# Patient Record
Sex: MALE | Race: WHITE | Employment: FULL TIME | URBAN - METROPOLITAN AREA
[De-identification: names, ages, dates, MRNs, and addresses within clinical notes are randomized per-mention and may not be internally consistent; named-entity substitution may affect disease eponyms.]

---

## 2017-02-17 ENCOUNTER — HOSPITAL ENCOUNTER (OUTPATIENT)
Dept: LAB | Age: 37
Discharge: HOME | End: 2017-02-17

## 2017-02-17 PROCEDURE — 88305 TISSUE EXAM BY PATHOLOGIST: CPT | Performed by: INTERNAL MEDICINE

## 2018-03-15 ENCOUNTER — HOSPITAL ENCOUNTER (OUTPATIENT)
Dept: LAB | Age: 38
Discharge: HOME | End: 2018-03-15

## 2018-03-15 PROCEDURE — 88305 TISSUE EXAM BY PATHOLOGIST: CPT | Performed by: INTERNAL MEDICINE

## 2018-03-27 PROBLEM — C73 PAPILLARY THYROID CARCINOMA (HCC): Status: ACTIVE | Noted: 2018-03-27

## 2018-03-29 ENCOUNTER — HOSPITAL ENCOUNTER (OUTPATIENT)
Dept: ULTRASOUND IMAGING | Age: 38
Discharge: HOME | End: 2018-03-29
Attending: INTERNAL MEDICINE
Payer: COMMERCIAL

## 2018-03-29 DIAGNOSIS — C73 PAPILLARY THYROID CARCINOMA (HCC): ICD-10-CM

## 2018-03-29 PROCEDURE — 76536 US EXAM OF HEAD AND NECK: CPT

## 2018-03-29 NOTE — PROGRESS NOTES
Please let . Constantine Casas know that I just looked at his lymph node ultrasound, and he has no obviously abnormal appearing lymph nodes. I will be sure to pass that on to the surgeon to whom we have referred him. If he has questions, let me know.

## 2018-10-01 ENCOUNTER — HOSPITAL ENCOUNTER (OUTPATIENT)
Dept: INFUSION THERAPY | Age: 38
Discharge: HOME | End: 2018-10-01
Payer: COMMERCIAL

## 2018-10-01 VITALS
SYSTOLIC BLOOD PRESSURE: 147 MMHG | HEART RATE: 74 BPM | TEMPERATURE: 98.2 F | OXYGEN SATURATION: 98 % | DIASTOLIC BLOOD PRESSURE: 85 MMHG | RESPIRATION RATE: 18 BRPM

## 2018-10-01 PROCEDURE — 74011000250 HC RX REV CODE- 250: Performed by: INTERNAL MEDICINE

## 2018-10-01 PROCEDURE — 96372 THER/PROPH/DIAG INJ SC/IM: CPT

## 2018-10-01 PROCEDURE — 74011250636 HC RX REV CODE- 250/636: Performed by: INTERNAL MEDICINE

## 2018-10-01 RX ADMIN — WATER 0.9 MG: 1 INJECTION INTRAMUSCULAR; INTRAVENOUS; SUBCUTANEOUS at 07:55

## 2018-10-01 NOTE — PROGRESS NOTES
Arrived to the [facility]. Thyrogen injection completed. Patient tolerated well. Any issues or concerns during appointment: none. Patient aware of next infusion appointment on 10/2/18 at 0800. Discharged ambulatory.  
 
Salo Dockery RN

## 2018-10-02 ENCOUNTER — HOSPITAL ENCOUNTER (OUTPATIENT)
Dept: INFUSION THERAPY | Age: 38
Discharge: HOME | End: 2018-10-02
Payer: COMMERCIAL

## 2018-10-02 VITALS
HEART RATE: 82 BPM | WEIGHT: 304.8 LBS | SYSTOLIC BLOOD PRESSURE: 141 MMHG | TEMPERATURE: 97.6 F | DIASTOLIC BLOOD PRESSURE: 87 MMHG | OXYGEN SATURATION: 100 % | BODY MASS INDEX: 40.21 KG/M2 | RESPIRATION RATE: 18 BRPM

## 2018-10-02 PROCEDURE — 96372 THER/PROPH/DIAG INJ SC/IM: CPT

## 2018-10-02 PROCEDURE — 74011000250 HC RX REV CODE- 250: Performed by: INTERNAL MEDICINE

## 2018-10-02 PROCEDURE — 74011250636 HC RX REV CODE- 250/636: Performed by: INTERNAL MEDICINE

## 2018-10-02 RX ADMIN — WATER 0.9 MG: 1 INJECTION INTRAMUSCULAR; INTRAVENOUS; SUBCUTANEOUS at 08:15

## 2018-10-02 NOTE — PROGRESS NOTES
Arrived to the [facility]. Thyrogen injection completed. Patient tolerated well Any issues or concerns during appointment: No. 
No further appointments in [facility]PI @ this time Discharged ambulatory

## 2018-10-03 ENCOUNTER — HOSPITAL ENCOUNTER (OUTPATIENT)
Dept: NUCLEAR MEDICINE | Age: 38
Discharge: HOME | End: 2018-10-03
Attending: INTERNAL MEDICINE

## 2018-10-03 ENCOUNTER — HOSPITAL ENCOUNTER (OUTPATIENT)
Dept: LAB | Age: 38
Discharge: HOME | End: 2018-10-03
Attending: INTERNAL MEDICINE
Payer: COMMERCIAL

## 2018-10-03 DIAGNOSIS — C73 PAPILLARY THYROID CARCINOMA (HCC): ICD-10-CM

## 2018-10-03 LAB
T4 FREE SERPL-MCNC: 1.1 NG/DL (ref 0.78–1.46)
TSH SERPL DL<=0.005 MIU/L-ACNC: 53 UIU/ML

## 2018-10-03 PROCEDURE — 36415 COLL VENOUS BLD VENIPUNCTURE: CPT

## 2018-10-03 PROCEDURE — 86800 THYROGLOBULIN ANTIBODY: CPT

## 2018-10-03 PROCEDURE — 84443 ASSAY THYROID STIM HORMONE: CPT

## 2018-10-03 PROCEDURE — 84439 ASSAY OF FREE THYROXINE: CPT

## 2018-10-03 PROCEDURE — 84432 ASSAY OF THYROGLOBULIN: CPT

## 2018-10-04 LAB
THYROGLOB AB SERPL-ACNC: <1 IU/ML (ref 0–0.9)
THYROGLOBULIN, SERUM, 006694: 0.4 NG/ML (ref 1.4–29.2)

## 2018-10-10 ENCOUNTER — HOSPITAL ENCOUNTER (OUTPATIENT)
Dept: NUCLEAR MEDICINE | Age: 38
Discharge: HOME | End: 2018-10-10
Attending: INTERNAL MEDICINE
Payer: COMMERCIAL

## 2018-10-10 PROCEDURE — 79005 NUCLEAR RX ORAL ADMIN: CPT

## 2019-04-26 ENCOUNTER — HOSPITAL ENCOUNTER (OUTPATIENT)
Dept: LAB | Age: 39
Discharge: HOME | End: 2019-04-26

## 2019-04-26 PROCEDURE — 88305 TISSUE EXAM BY PATHOLOGIST: CPT

## 2021-12-17 ENCOUNTER — HOSPITAL ENCOUNTER (OUTPATIENT)
Dept: LAB | Age: 41
Discharge: HOME | End: 2021-12-17

## 2021-12-17 PROCEDURE — 88305 TISSUE EXAM BY PATHOLOGIST: CPT

## 2022-03-18 PROBLEM — C73 PAPILLARY THYROID CARCINOMA (HCC): Status: ACTIVE | Noted: 2018-03-27

## 2022-06-02 DIAGNOSIS — C73 PAPILLARY THYROID CARCINOMA (HCC): Primary | ICD-10-CM

## 2022-06-02 DIAGNOSIS — E03.9 PRIMARY HYPOTHYROIDISM: ICD-10-CM

## 2022-09-21 DIAGNOSIS — E03.9 PRIMARY HYPOTHYROIDISM: ICD-10-CM

## 2022-09-21 DIAGNOSIS — C73 PAPILLARY THYROID CARCINOMA (HCC): ICD-10-CM

## 2022-09-22 LAB
T4 FREE SERPL-MCNC: 1.1 NG/DL (ref 0.78–1.46)
TSH, 3RD GENERATION: 0.1 UIU/ML (ref 0.36–3.74)

## 2022-09-23 ENCOUNTER — OFFICE VISIT (OUTPATIENT)
Dept: ENDOCRINOLOGY | Age: 42
End: 2022-09-23
Payer: COMMERCIAL

## 2022-09-23 VITALS
HEART RATE: 87 BPM | BODY MASS INDEX: 43.4 KG/M2 | OXYGEN SATURATION: 98 % | DIASTOLIC BLOOD PRESSURE: 88 MMHG | SYSTOLIC BLOOD PRESSURE: 124 MMHG | WEIGHT: 315 LBS

## 2022-09-23 DIAGNOSIS — C73 PAPILLARY THYROID CARCINOMA (HCC): Primary | ICD-10-CM

## 2022-09-23 DIAGNOSIS — E03.9 PRIMARY HYPOTHYROIDISM: ICD-10-CM

## 2022-09-23 LAB
THYROGLOB AB SERPL-ACNC: <1 IU/ML (ref 0–0.9)
THYROGLOBULIN: <0.1 NG/ML (ref 1.4–29.2)

## 2022-09-23 PROCEDURE — 99214 OFFICE O/P EST MOD 30 MIN: CPT | Performed by: INTERNAL MEDICINE

## 2022-09-23 RX ORDER — LEVOTHYROXINE SODIUM 137 UG/1
274 TABLET ORAL
Qty: 180 TABLET | Refills: 3
Start: 2022-09-23

## 2022-09-23 NOTE — PROGRESS NOTES
Emy Jaramillo MD, [address] [facility]            Reason for visit: Follow-up of thyroid cancer      ASSESSMENT AND PLAN:    1. Papillary thyroid carcinoma [facility])  He is now status post thyroidectomy 6/28/2018 for a [id number] papillary thyroid carcinoma. He received 102.1 mCi iodine 131 10/3/2018 (I recommended 50 mCi). Both suppressed and stimulated pretreatment thyroglobulin was very low. Posttreatment whole-body scan demonstrated no evidence of local or metastatic disease. Ultrasound done in January 2020 was unremarkable. His prognosis going forward is excellent, and risk of recurrence is low. - Thyroglobulin Panel; Future    2. Primary hypothyroidism  She has hypothyroidism diagnosed prior to thyroidectomy. TSH target is 0.1-1.5. Continue levothyroxine as currently prescribed. Return in 6 months with labs. - levothyroxine (SYNTHROID) 137 MCG tablet; Take 2 tablets by mouth every morning (before breakfast)  Dispense: 180 tablet; Refill: 3  - TSH; Future  - T4, Free; Future        Follow-up and Dispositions    Return in about 6 months (around 3/23/2023). History of Present Illness:    THYROID CANCER  Anthony Talbot is seen in the [place] [id number] for follow-up of thyroid cancer. He is now status post thyroidectomy in June 2018 and radioactive iodine in October 2018.        Type of thyroid cancer: papillary thyroid cancer     Date thyroid cancer diagnosed: 3/16/2018 on fine-needle biopsy of a sonographically suspicious left thyroid lobe nodule     Date(s) of surgery: 6/28/2018 (total thyroidectomy/left central lymphadenectomy, Dr. Kvng Benitez)     Surgical pathology: 1.1 cm classic papillary carcinoma in the left lobe, 4/11 level VI lymph nodes positive     AJCC stage: T1 - Tumor 2 cm or less in greatest dimension limited to the thyroid, N1a - Metastasis to Level VI (pretracheal, paratracheal, and prelaryngeal/Delphian lymph nodes), M0 - Distant metastasis not present (stage I)     Radioactive iodine treatment: 10/3/2018- 102.1 mCi iodine-131 following Thyrogen stimulation (I recommended 50 mCi)     Treatment of hypothyroidism: He was diagnosed with hypothyroidism in his late teens (prior to thyroidectomy). He has been on levothyroxine since diagnosis. The most recent dose adjustments have been as follows:  -levothyroxine 125 mcg daily (dose since 2016 or 2017) to Synthroid 225 mcg daily following thyroidectomy  -levothyroxine 224 mcg daily in January 2019  -250 mcg daily 12/30/2021  -274 mcg 4/14/2022     Labs:  8/17/2018: TSH 0.100, free T4 1.69, thyroglobulin 0.2, thyroglobulin antibodies less than 1.0, calcium 10.1.  10/3/2018: Thyrogen-stimulated thyroglobulin 0.4, thyroglobulin antibodies less than 1.0 (TSH 53.000). 1/8/2019: TSH 0.117, free T4 1.46, thyroglobulin less than 0.1, thyroglobulin antibodies less than 1.0.  7/19/2019: TSH 0.528, free T4 1.36, thyroglobulin less than 0.1, thyroglobulin antibodies less than 1.0.  1/31/2020: TSH 1.410, free T4 0.95, thyroglobulin less than 0.1, thyroglobulin antibodies less than 1.0.  7/28/2020: TSH 0.782, thyroglobulin less than 0.1, thyroglobulin antibodies less than 1.0.  12/28/2021: TSH 7.910, free T4 0.92, thyroglobulin less than 0.1, thyroglobulin antibodies less than 1.0.  4/5/2022: TSH 4.850, free T4 1.08.  9/21/2022: TSH 0.095, free T4 1.1, thyroglobulin pending, thyroglobulin antibodies pending. Imaging:   Before surgery:  2/28/2018: Ultrasound [facility])- Right lobe 8.0 x 4.1 x 4.5 cm, isthmus 0.5 cm, left lobe 6.1 x 1.5 x 1.9 cm.  6.3 x 3.7 x 4.5 cm predominantly solid nodule replacing most of the right lobe. 1.1 x 0.8 x 0.9 cm hypoechoic solid nodule without microcalcifications in the lateral midportion of the left lobe. 1.1 x 0.8 x 0.9 cm hypoechoic solid nodule without microcalcifications in the medial midportion of the left lobe.   1.5 x 0.9 x 1.0 cm hypoechoic solid nodule without microcalcifications at the left lower pole. 3/29/2018: [facility] no obvious lympadenopathy     Since surgery:  10/10/2018: Thyroid-stimulating posttreatment whole-body scan ([facility])- Uptake in the thyroid bed. No local or distant metastatic disease. 1/31/2020: Ultrasound ([facility])- no evidence for local recurrence. Current symptoms: See review of systems below     Family history of thyroid cancer:  No    Review of Systems   Constitutional:  Negative for fatigue and unexpected weight change. Cardiovascular:  Negative for palpitations. /88 (Site: Left Upper Arm, Position: Sitting)   Pulse 87   Wt (!) 320 lb (145.2 kg)   SpO2 98%   BMI 43.40 kg/m²   Wt Readings from Last 3 Encounters:   09/23/22 (!) 320 lb (145.2 kg)   04/14/22 (!) 325 lb (147.4 kg)   12/30/21 (!) 328 lb 9.6 oz (149.1 kg)       Physical Exam  Constitutional:       Appearance: Normal appearance. HENT:      Head: Normocephalic. Neck:      Thyroid: No thyroid mass or thyromegaly. Comments: Healed thyroidectomy scar. No palpable neck masses. Cardiovascular:      Rate and Rhythm: Normal rate and regular rhythm. Pulmonary:      Effort: Pulmonary effort is normal.      Breath sounds: Normal breath sounds. Neurological:      Mental Status: He is alert.    Psychiatric:         Mood and Affect: Mood normal.         Behavior: Behavior normal.       Orders Placed This Encounter   Procedures    TSH     Standing Status:   Future     Standing Expiration Date:   9/23/2023    T4, Free     Standing Status:   Future     Standing Expiration Date:   9/23/2023    Thyroglobulin Panel     Standing Status:   Future     Standing Expiration Date:   9/23/2023       Current Outpatient Medications   Medication Sig Dispense Refill    levothyroxine (SYNTHROID) 137 MCG tablet Take 2 tablets by mouth every morning (before breakfast) 180 tablet 3    Multiple Vitamin (MULTIVITAMIN PO) Take by mouth      loratadine

## 2023-03-28 DIAGNOSIS — E03.9 PRIMARY HYPOTHYROIDISM: ICD-10-CM

## 2023-03-28 DIAGNOSIS — C73 PAPILLARY THYROID CARCINOMA (HCC): ICD-10-CM

## 2023-03-29 LAB
THYROGLOB AB SERPL-ACNC: <1 IU/ML (ref 0–0.9)
THYROGLOBULIN: <0.1 NG/ML (ref 1.4–29.2)

## 2023-03-30 LAB
T4 FREE SERPL-MCNC: 0.8 NG/DL (ref 0.78–1.46)
TSH, 3RD GENERATION: 2.31 UIU/ML (ref 0.36–3.74)

## 2023-03-31 ENCOUNTER — OFFICE VISIT (OUTPATIENT)
Dept: ENDOCRINOLOGY | Age: 43
End: 2023-03-31
Payer: COMMERCIAL

## 2023-03-31 VITALS
DIASTOLIC BLOOD PRESSURE: 75 MMHG | OXYGEN SATURATION: 97 % | SYSTOLIC BLOOD PRESSURE: 120 MMHG | WEIGHT: 311 LBS | HEART RATE: 87 BPM | BODY MASS INDEX: 42.18 KG/M2

## 2023-03-31 DIAGNOSIS — C73 PAPILLARY THYROID CARCINOMA (HCC): Primary | ICD-10-CM

## 2023-03-31 DIAGNOSIS — E03.9 PRIMARY HYPOTHYROIDISM: ICD-10-CM

## 2023-03-31 PROCEDURE — 99214 OFFICE O/P EST MOD 30 MIN: CPT | Performed by: INTERNAL MEDICINE

## 2023-03-31 RX ORDER — ALLOPURINOL 100 MG/1
100 TABLET ORAL DAILY
COMMUNITY
Start: 2023-02-16

## 2023-03-31 RX ORDER — LISINOPRIL 20 MG/1
20 TABLET ORAL EVERY MORNING
COMMUNITY
Start: 2023-02-07

## 2023-03-31 RX ORDER — AMLODIPINE BESYLATE 10 MG/1
TABLET ORAL
COMMUNITY
Start: 2023-02-07

## 2023-03-31 RX ORDER — LEVOTHYROXINE SODIUM 137 UG/1
274 TABLET ORAL
Qty: 180 TABLET | Refills: 3 | Status: SHIPPED | OUTPATIENT
Start: 2023-03-31

## 2023-03-31 NOTE — PROGRESS NOTES
Mendel Daily, MD, [address] [facility]            Reason for visit: Follow-up of thyroid cancer      ASSESSMENT AND PLAN:    1. Papillary thyroid carcinoma [facility])  He is now status post thyroidectomy 6/28/2018 for a [id number] papillary thyroid carcinoma. He received 102.1 mCi iodine 131 10/3/2018 (I recommended 50 mCi). Both suppressed and stimulated pretreatment thyroglobulin was very low. Posttreatment whole-body scan demonstrated no evidence of local or metastatic disease. Ultrasound done in January 2020 was unremarkable. His prognosis going forward is excellent, and risk of recurrence is low. - Thyroglobulin Panel; Future    2. Primary hypothyroidism  She has hypothyroidism diagnosed prior to thyroidectomy. TSH target is 0.1-1.5. His TSH is currently slightly above that but acceptable. I will have him continue levothyroxine as currently prescribed. Return in 6 months with labs. - levothyroxine (SYNTHROID) 137 MCG tablet; Take 2 tablets by mouth every morning (before breakfast)  Dispense: 180 tablet; Refill: 3  - TSH; Future  - T4, Free; Future        Follow-up and Dispositions    Return in about 6 months (around 9/30/2023). History of Present Illness:    THYROID CANCER  Patrick Villa is seen in the [place] [id number] for follow-up of thyroid cancer. He is now status post thyroidectomy in June 2018 and radioactive iodine in October 2018.        Type of thyroid cancer: papillary thyroid cancer     Date thyroid cancer diagnosed: 3/16/2018 on fine-needle biopsy of a sonographically suspicious left thyroid lobe nodule     Date(s) of surgery: 6/28/2018 (total thyroidectomy/left central lymphadenectomy, Dr. Whit Colon)     Surgical pathology: 1.1 cm classic papillary carcinoma in the left lobe, 4/11 level VI lymph nodes positive     AJCC stage: T1 - Tumor 2 cm or less in greatest dimension limited to the thyroid, N1a - Metastasis to Level VI

## 2023-10-04 DIAGNOSIS — E03.9 PRIMARY HYPOTHYROIDISM: ICD-10-CM

## 2023-10-04 DIAGNOSIS — C73 PAPILLARY THYROID CARCINOMA (HCC): ICD-10-CM

## 2023-10-04 LAB
T4 FREE SERPL-MCNC: 1 NG/DL (ref 0.78–1.46)
TSH, 3RD GENERATION: 0.26 UIU/ML (ref 0.36–3.74)

## 2023-10-05 LAB
THYROGLOB AB SERPL-ACNC: <1 IU/ML (ref 0–0.9)
THYROGLOBULIN: <0.1 NG/ML (ref 1.4–29.2)

## 2023-10-06 ENCOUNTER — OFFICE VISIT (OUTPATIENT)
Dept: ENDOCRINOLOGY | Age: 43
End: 2023-10-06
Payer: COMMERCIAL

## 2023-10-06 VITALS
DIASTOLIC BLOOD PRESSURE: 74 MMHG | BODY MASS INDEX: 44.76 KG/M2 | HEART RATE: 105 BPM | WEIGHT: 315 LBS | OXYGEN SATURATION: 98 % | SYSTOLIC BLOOD PRESSURE: 120 MMHG

## 2023-10-06 DIAGNOSIS — C73 PAPILLARY THYROID CARCINOMA (HCC): Primary | ICD-10-CM

## 2023-10-06 DIAGNOSIS — E03.9 PRIMARY HYPOTHYROIDISM: ICD-10-CM

## 2023-10-06 PROCEDURE — 99214 OFFICE O/P EST MOD 30 MIN: CPT | Performed by: INTERNAL MEDICINE

## 2023-10-06 RX ORDER — LEVOTHYROXINE SODIUM 137 UG/1
274 TABLET ORAL
Qty: 180 TABLET | Refills: 3 | Status: SHIPPED | OUTPATIENT
Start: 2023-10-06

## 2023-10-06 NOTE — PROGRESS NOTES
Jean Garcia MD, [facility]            Reason for visit: Follow-up of thyroid cancer      ASSESSMENT AND PLAN:    1. Papillary thyroid carcinoma [facility])  He is now status post thyroidectomy 6/28/2018 for a [id number] papillary thyroid carcinoma. He received 102.1 mCi iodine 131 10/3/2018 (I recommended 50 mCi). Both suppressed and stimulated pretreatment thyroglobulin was very low. Posttreatment whole-body scan demonstrated no evidence of local or metastatic disease. Ultrasound done in January 2020 was unremarkable. His prognosis going forward is excellent, and risk of recurrence is low. - Thyroglobulin Panel; Future    2. Primary hypothyroidism  She has hypothyroidism diagnosed prior to thyroidectomy. TSH target is 0.1-1.5. His TSH is currently at target. I will have him continue levothyroxine as currently prescribed. Return in 6 months with labs. - levothyroxine (SYNTHROID) 137 MCG tablet; Take 2 tablets by mouth every morning (before breakfast)  Dispense: 180 tablet; Refill: 3  - TSH; Future  - T4, Free; Future        Follow-up and Dispositions    Return in about 6 months (around 4/6/2024). History of Present Illness:    THYROID CANCER  Ana Javed is seen in the [facility] for follow-up of thyroid cancer. He is now status post thyroidectomy in June 2018 and radioactive iodine in October 2018.        Type of thyroid cancer: papillary thyroid cancer     Date thyroid cancer diagnosed: 3/16/2018 on fine-needle biopsy of a sonographically suspicious left thyroid lobe nodule     Date(s) of surgery: 6/28/2018 (total thyroidectomy/left central lymphadenectomy, Dr. Destiny Qureshi)     Surgical pathology: 1.1 cm classic papillary carcinoma in the left lobe, 4/11 level VI lymph nodes positive     AJCC stage: T1 - Tumor 2 cm or less in greatest dimension limited to the thyroid, N1a - Metastasis to Level VI (pretracheal, paratracheal, and

## 2024-04-11 DIAGNOSIS — C73 PAPILLARY THYROID CARCINOMA (HCC): ICD-10-CM

## 2024-04-11 DIAGNOSIS — E03.9 PRIMARY HYPOTHYROIDISM: ICD-10-CM

## 2024-04-11 LAB
T4 FREE SERPL-MCNC: 1 NG/DL (ref 0.78–1.46)
TSH, 3RD GENERATION: 2.95 UIU/ML (ref 0.36–3.74)

## 2024-04-12 ENCOUNTER — OFFICE VISIT (OUTPATIENT)
Dept: ENDOCRINOLOGY | Age: 44
End: 2024-04-12
Payer: COMMERCIAL

## 2024-04-12 VITALS — DIASTOLIC BLOOD PRESSURE: 80 MMHG | WEIGHT: 315 LBS | BODY MASS INDEX: 45.57 KG/M2 | SYSTOLIC BLOOD PRESSURE: 122 MMHG

## 2024-04-12 DIAGNOSIS — Z85.850 HISTORY OF THYROID CANCER: Primary | ICD-10-CM

## 2024-04-12 DIAGNOSIS — E03.9 PRIMARY HYPOTHYROIDISM: ICD-10-CM

## 2024-04-12 PROCEDURE — 99214 OFFICE O/P EST MOD 30 MIN: CPT | Performed by: INTERNAL MEDICINE

## 2024-04-12 RX ORDER — LEVOTHYROXINE SODIUM 137 UG/1
274 TABLET ORAL
Qty: 180 TABLET | Refills: 3 | Status: SHIPPED | OUTPATIENT
Start: 2024-04-12

## 2024-04-12 RX ORDER — AMLODIPINE BESYLATE VALSARTAN HYDROCHLOROTHIAZIDE 10; 25; 160 MG/1; MG/1; MG/1
1 TABLET, FILM COATED ORAL EVERY MORNING
COMMUNITY
Start: 2024-03-08 | End: 2025-03-08

## 2024-04-12 NOTE — PROGRESS NOTES
JERO Yan MD, [facility] ENDOCRINOLOGY   AND   [facility]            Reason for visit: Follow-up of thyroid cancer      ASSESSMENT AND PLAN:    1. History of papillary thyroid carcinoma (1.1 cm classical variant, left lobe) status post thyroidectomy (4/11 level VI lymph nodes positive) 6/28/2018 and 1 over 2.1 mCi iodine-131 10/3/2018  He is now status post thyroidectomy 6/28/2018 for a [id number] papillary thyroid carcinoma.  He received 102.1 mCi iodine 131 10/3/2018 (I recommended 50 mCi).  Both suppressed and stimulated pretreatment thyroglobulin was very low.  Posttreatment whole-body scan demonstrated no evidence of local or metastatic disease.  Ultrasound done in January 2020 was unremarkable.  His prognosis going forward is excellent, and risk of recurrence is low.  He will be followed with yearly thyroglobulin.  - Thyroglobulin Ab and Thyroglobulin, TAYLOR or ANGELA; Future    2. Primary hypothyroidism preceding thyroidectomy  TSH target is the low half of the normal range.  No changes.  Reassess in 1 year.  - levothyroxine (SYNTHROID) 137 MCG tablet; Take 2 tablets by mouth every morning (before breakfast)  Dispense: 180 tablet; Refill: 3  - TSH; Future  - T4, Free; Future          Follow-up and Dispositions    Return in about 1 year (around 4/12/2025).             History of Present Illness:    THYROID CANCER  Sabino Rizvi is seen in the [facility] for follow-up of thyroid cancer.  He is now status post thyroidectomy in June 2018 and radioactive iodine in October 2018.       Type of thyroid cancer: papillary thyroid cancer     Date thyroid cancer diagnosed: 3/16/2018 on fine-needle biopsy of a sonographically suspicious left thyroid lobe nodule     Date(s) of surgery: 6/28/2018 (total thyroidectomy/left central lymphadenectomy, Dr. Huston)     Surgical pathology: 1.1 cm classic papillary carcinoma in the left lobe, 4/11 level VI lymph nodes positive     AJCC stage: T1 -

## 2024-04-14 LAB
THYROGLOB AB SERPL-ACNC: <1 IU/ML (ref 0–0.9)
THYROGLOBULIN: <0.1 NG/ML (ref 1.4–29.2)

## 2025-03-31 DIAGNOSIS — E03.9 PRIMARY HYPOTHYROIDISM: ICD-10-CM

## 2025-03-31 RX ORDER — LEVOTHYROXINE SODIUM 137 UG/1
TABLET ORAL
Qty: 360 TABLET | Refills: 0 | OUTPATIENT
Start: 2025-03-31

## 2025-04-24 DIAGNOSIS — E03.9 PRIMARY HYPOTHYROIDISM: ICD-10-CM

## 2025-04-24 DIAGNOSIS — Z85.850 HISTORY OF THYROID CANCER: ICD-10-CM

## 2025-04-24 LAB
T4 FREE SERPL-MCNC: 1.7 NG/DL (ref 0.9–1.7)
TSH, 3RD GENERATION: 0.33 UIU/ML (ref 0.27–4.2)

## 2025-04-26 LAB
THYROGLOB AB SERPL-ACNC: <1 IU/ML (ref 0–0.9)
THYROGLOBULIN: <0.1 NG/ML (ref 1.4–29.2)

## 2025-04-30 ENCOUNTER — OFFICE VISIT (OUTPATIENT)
Dept: ENDOCRINOLOGY | Age: 45
End: 2025-04-30
Payer: COMMERCIAL

## 2025-04-30 VITALS
HEIGHT: 73 IN | WEIGHT: 311 LBS | SYSTOLIC BLOOD PRESSURE: 120 MMHG | HEART RATE: 78 BPM | DIASTOLIC BLOOD PRESSURE: 70 MMHG | OXYGEN SATURATION: 98 % | BODY MASS INDEX: 41.22 KG/M2

## 2025-04-30 DIAGNOSIS — Z85.850 HISTORY OF THYROID CANCER: Primary | ICD-10-CM

## 2025-04-30 DIAGNOSIS — E03.9 PRIMARY HYPOTHYROIDISM: ICD-10-CM

## 2025-04-30 PROCEDURE — 99214 OFFICE O/P EST MOD 30 MIN: CPT | Performed by: INTERNAL MEDICINE

## 2025-04-30 RX ORDER — ATORVASTATIN CALCIUM 10 MG/1
10 TABLET, FILM COATED ORAL NIGHTLY
COMMUNITY

## 2025-04-30 RX ORDER — TIRZEPATIDE 2.5 MG/.5ML
2.5 INJECTION, SOLUTION SUBCUTANEOUS WEEKLY
COMMUNITY
Start: 2025-04-14

## 2025-04-30 RX ORDER — LEVOTHYROXINE SODIUM 137 UG/1
274 TABLET ORAL
Qty: 180 TABLET | Refills: 3 | Status: SHIPPED | OUTPATIENT
Start: 2025-04-30

## 2025-04-30 ASSESSMENT — ENCOUNTER SYMPTOMS
CONSTIPATION: 0
DIARRHEA: 0

## 2025-04-30 NOTE — PROGRESS NOTES
JERO Yan MD, [facility] ENDOCRINOLOGY   AND   [facility]RO[facility]            Reason for visit: Follow-up of thyroid cancer      ASSESSMENT AND PLAN:    1. History of papillary thyroid carcinoma (1.1 cm classical variant, left lobe) status post thyroidectomy (4/11 level VI lymph nodes positive) 6/28/2018 and 1 over 2.1 mCi iodine-131 10/3/2018  He is now status post thyroidectomy 6/28/2018 for a [id number] papillary thyroid carcinoma.  He received 102.1 mCi iodine 131 10/3/2018 (I recommended 50 mCi).  Both suppressed and stimulated pretreatment thyroglobulin was very low.  Posttreatment whole-body scan demonstrated no evidence of local or metastatic disease.  Ultrasound done in January 2020 was unremarkable.  His prognosis going forward is excellent, and risk of recurrence is low.  He will be followed with yearly thyroglobulin.    2. Primary hypothyroidism preceding thyroidectomy  TSH target is the low half of the normal range.  No changes.  Reassess in 6 months.- levothyroxine (SYNTHROID) 137 MCG tablet; Take 2 tablets by mouth every morning (before breakfast)  Dispense: 180 tablet; Refill: 3  - TSH; Future  - T4, Free; Future          Follow-up and Dispositions    Return in about 6 months (around 10/30/2025).               History of Present Illness:    THYROID CANCER  Sabino Rizvi is seen in the [facility] for follow-up of thyroid cancer.  He is now status post thyroidectomy in June 2018 and radioactive iodine in October 2018.       Type of thyroid cancer: papillary thyroid cancer     Date thyroid cancer diagnosed: 3/16/2018 on fine-needle biopsy of a sonographically suspicious left thyroid lobe nodule     Date(s) of surgery: 6/28/2018 (total thyroidectomy/left central lymphadenectomy, Dr. Huston)     Surgical pathology: 1.1 cm classic papillary carcinoma in the left lobe, 4/11 level VI lymph nodes positive     AJCC stage: T1 - Tumor 2 cm or less in greatest dimension limited to the